# Patient Record
Sex: FEMALE | Race: OTHER | HISPANIC OR LATINO | URBAN - METROPOLITAN AREA
[De-identification: names, ages, dates, MRNs, and addresses within clinical notes are randomized per-mention and may not be internally consistent; named-entity substitution may affect disease eponyms.]

---

## 2022-04-01 ENCOUNTER — EMERGENCY (EMERGENCY)
Facility: HOSPITAL | Age: 22
LOS: 1 days | Discharge: HOME | End: 2022-04-01
Attending: EMERGENCY MEDICINE
Payer: SELF-PAY

## 2022-04-01 VITALS
DIASTOLIC BLOOD PRESSURE: 72 MMHG | TEMPERATURE: 99 F | WEIGHT: 138.01 LBS | HEIGHT: 62 IN | SYSTOLIC BLOOD PRESSURE: 121 MMHG | RESPIRATION RATE: 18 BRPM | HEART RATE: 89 BPM | OXYGEN SATURATION: 99 %

## 2022-04-01 PROCEDURE — 90471 IMMUNIZATION ADMIN: CPT

## 2022-04-01 PROCEDURE — 99283 EMERGENCY DEPT VISIT LOW MDM: CPT | Mod: 25

## 2022-04-01 PROCEDURE — 99283 EMERGENCY DEPT VISIT LOW MDM: CPT

## 2022-04-01 PROCEDURE — 90715 TDAP VACCINE 7 YRS/> IM: CPT

## 2022-04-01 RX ORDER — OFLOXACIN 0.3 %
1 DROPS OPHTHALMIC (EYE) ONCE
Refills: 0 | Status: COMPLETED | OUTPATIENT
Start: 2022-04-01 | End: 2022-04-01

## 2022-04-01 RX ORDER — IBUPROFEN 200 MG
600 TABLET ORAL ONCE
Refills: 0 | Status: DISCONTINUED | OUTPATIENT
Start: 2022-04-01 | End: 2022-04-01

## 2022-04-01 RX ORDER — ACETAMINOPHEN 500 MG
650 TABLET ORAL ONCE
Refills: 0 | Status: DISCONTINUED | OUTPATIENT
Start: 2022-04-01 | End: 2022-04-01

## 2022-04-01 RX ORDER — TETANUS TOXOID, REDUCED DIPHTHERIA TOXOID AND ACELLULAR PERTUSSIS VACCINE, ADSORBED 5; 2.5; 8; 8; 2.5 [IU]/.5ML; [IU]/.5ML; UG/.5ML; UG/.5ML; UG/.5ML
0.5 SUSPENSION INTRAMUSCULAR ONCE
Refills: 0 | Status: COMPLETED | OUTPATIENT
Start: 2022-04-01 | End: 2022-04-01

## 2022-04-01 RX ADMIN — TETANUS TOXOID, REDUCED DIPHTHERIA TOXOID AND ACELLULAR PERTUSSIS VACCINE, ADSORBED 0.5 MILLILITER(S): 5; 2.5; 8; 8; 2.5 SUSPENSION INTRAMUSCULAR at 21:15

## 2022-04-01 RX ADMIN — Medication 1 DROP(S): at 21:16

## 2022-04-01 NOTE — ED PROVIDER NOTE - CLINICAL SUMMARY MEDICAL DECISION MAKING FREE TEXT BOX
corneal abrasion noted, will treat with eye drops, update tetanus, f/u with ophtho, FROM of the eye, pressures stable, normal eye exam

## 2022-04-01 NOTE — ED PROVIDER NOTE - PATIENT PORTAL LINK FT
You can access the FollowMyHealth Patient Portal offered by [facility] by registering at the following website: [email or web address]. By joining [employer]’s FollowMyHealth portal, you will also be able to view your health information using other applications (apps) compatible with our system.

## 2022-04-01 NOTE — ED PROVIDER NOTE - ATTENDING CONTRIBUTION TO CARE
I, Blayne Lowry, have personally performed a face to face diagnostic evaluation on this patient. I have reviewed the RAMON note and agree with the history, exam and plan of care, except as noted.    22 yo F p/w right eye pain x 1 day. foreign body sensation. no contact use.  no foreign body seen but found to have corneal abrasion. eye drops given. opthalmology follow up.

## 2022-04-01 NOTE — ED PROVIDER NOTE - NSFOLLOWUPINSTRUCTIONS_ED_ALL_ED_FT
1) Neeru un seguimiento con mullen médico de atención primaria en los próximos 5-7 días. Llame mañana para concertar femi sonja. Si no puede hacer un seguimiento con mullen médico de atención primaria, regrese al servicio de urgencias por cualquier problema urgente.  2) Se le entregó femi copia de las pruebas realizadas hoy. Traiga los resultados y revíselos con mullen médico de atención primaria.  3) Si tiene algún empeoramiento de los síntomas o cualquier otra inquietud, regrese al servicio de urgencias de inmediato.  4) Continúe tomando kathy medicamentos caseros según las indicaciones.

## 2022-04-01 NOTE — ED PROVIDER NOTE - CARE PROVIDER_API CALL
Ruiz Knutson)  Ophthalmology  [address] [place] [address]  Phone: [phone]  Fax: [phone]  Follow Up Time:

## 2022-04-01 NOTE — ED PROVIDER NOTE - OBJECTIVE STATEMENT
20 y/o female with no sign medical history presents to the ED c/o right eye pain for the past day. notes symptoms last approx 6 hours. Denies any trauma or foreign body in the eye. Pain with light exposure and movement of the eye. Denies fevers, chills, n/v, loss of vision, blurry vision. tetanus unknown.

## 2022-04-01 NOTE — ED PROVIDER NOTE - NS ED ATTENDING STATEMENT MOD
This was a shared visit with the RAMON. I reviewed and verified the documentation and independently performed the documented:

## 2022-04-01 NOTE — ED PROVIDER NOTE - CHPI ED SYMPTOMS NEG
no blurred vision/no discharge/no drainage/no itching/no purulent drainage/no double vision/no eye lid swelling/no foreign body

## 2022-04-01 NOTE — ED ADULT NURSE NOTE - OBJECTIVE STATEMENT
Pt in no apparent distress at this time. Airway patent, breathing spontaneous and nonlabored. Pt A&Ox3 resting in stretcher. Pt c/o    right eye pain.

## 2022-10-17 NOTE — ED PROVIDER NOTE - PATIENT/CAREGIVER ACCEPTED INTERPRETER SERVICES
You can access the FollowMyHealth Patient Portal offered by [facility] by registering at the following website: [email or web address]. By joining [employer]’s FollowMyHealth portal, you will also be able to view your health information using other applications (apps) compatible with our system. yes

## 2023-02-16 ENCOUNTER — EMERGENCY (EMERGENCY)
Facility: HOSPITAL | Age: 23
LOS: 1 days | Discharge: HOME | End: 2023-02-16
Attending: EMERGENCY MEDICINE
Payer: SELF-PAY

## 2023-02-16 VITALS
TEMPERATURE: 99 F | HEART RATE: 107 BPM | DIASTOLIC BLOOD PRESSURE: 92 MMHG | RESPIRATION RATE: 18 BRPM | SYSTOLIC BLOOD PRESSURE: 143 MMHG | OXYGEN SATURATION: 99 % | HEIGHT: 63 IN | WEIGHT: 160.28 LBS

## 2023-02-16 VITALS — OXYGEN SATURATION: 99 % | HEART RATE: 92 BPM | RESPIRATION RATE: 17 BRPM

## 2023-02-16 PROBLEM — Z78.9 OTHER SPECIFIED HEALTH STATUS: Chronic | Status: ACTIVE | Noted: 2022-04-01

## 2023-02-16 PROCEDURE — 99282 EMERGENCY DEPT VISIT SF MDM: CPT

## 2023-02-16 PROCEDURE — 99283 EMERGENCY DEPT VISIT LOW MDM: CPT

## 2023-02-16 PROCEDURE — 99284 EMERGENCY DEPT VISIT MOD MDM: CPT

## 2023-02-16 NOTE — ED PROVIDER NOTE - CLINICAL SUMMARY MEDICAL DECISION MAKING FREE TEXT BOX
PT with stable VS, no acute distress, non toxic appearing, tolerating PO in the ED, Pt with no acute findings on PE, Pt with low risk exposure, symptomatic improvement with rest, Pt cleared for dc home with supportive care, follow up to PCP, educated about when to return to the ED if needed. PT verbalizes that he understands all instructions and results. Pt informed that ED is open and available 24/7 365 days a yr, encouraged to return to the ED if they have any change in condition, or feel the need for revaluation.    utilized to obtain History, ROS, Physical Exam, explanations of results and plan of care, as well as follow up instructions.

## 2023-02-16 NOTE — ED PROVIDER NOTE - ATTENDING APP SHARED VISIT CONTRIBUTION OF CARE
: dipak garza eye burning, cough, nose bleed and feeling lightheaded while at work, cleaning with chemicals.  PE:  nontoxic appearing, NARD, mild conjunctival injection jese eyes, normal oropharynx, chest CTA jese without r/r/w.  A/P chemical conjunctivitis, upper airway irritation due to chemical exposure.  Advised non-medicated visine drops as needed for eye dryness, non-medicated nasal saline spray, cool mist humidifier.

## 2023-02-16 NOTE — ED PROVIDER NOTE - NS ED ROS FT
ROS: CONTUSIONAL: Denies fever, chills, fatigue, wt loss. HEAD: HA,  Denies trauma, Dizziness. EYE: Denies Acute visual changes, diplopia. ENMT:  epistaxis,  sore throat.  Denies change in hearing, tinnitus, difficulty swallowing,CARDIO: Denies CP, palpitations, edema. RESP: Denies Cough, SOB , Diff breathing, hemoptysis. GI: Denies N/V, ABD pain, change in bowel movement. URINARY: Denies difficulty urinating, pelvic pain. MS:  Denies joint pain, back pain, weakness, decreased ROM, swelling. NEURO: Denies change in gait, seizures, loss of sensation, dizziness, confusion LOC.  PSY: NO SI/HI. SKIN: Denies Rash, bruising.

## 2023-02-16 NOTE — ED PROVIDER NOTE - OBJECTIVE STATEMENT
PT with no SPMHx presents to the ED with complaint of nose bleed and HA that s PT with no SPMHx presents to the ED with complaint of nose bleed and HA. Pt states that she was at home cleaning her bathroom when she used Lysol, soft scrub and Clorox all at the same time. Pt states that she had a gradual onset of HA, mild throat pain that feels like burning then developed a bloody nose. Pt dines fever, chills, SOB, diff breathing, change in vision, LOC, rash, hematopoesis.

## 2023-02-16 NOTE — ED PROVIDER NOTE - ADDITIONAL NOTES AND INSTRUCTIONS:
PT was evaluated At [facility] ED and was found to have a condition that warranted time of to rest and heal from WORK/SCHOOL.   Nitesh Rincon PA-C

## 2023-02-16 NOTE — ED PROVIDER NOTE - NSFOLLOWUPINSTRUCTIONS_ED_ALL_ED_FT
Lesión por inhalación de sustancias químicas en los adultos    Chemical Inhalation Injury, Adult       Femi lesión por inhalación de sustancias químicas ocurre cuando femi persona respira (inhala) gases o partículas de sustancias químicas que dañan la garganta, las vías respiratorias o los pulmones (aparato respiratorio).    Las lesiones por inhalación de sustancias químicas pueden variar desde femi leve irritación hasta femi grave lesión en lo profundo de los pulmones. [place] podría afectar mullen capacidad para respirar. La gravedad de la lesión dependerá de la sustancia química involucrada, de cuán nociva es y del tiempo de exposición a esta.      ¿Cuáles son las causas?    Las lesiones por inhalación de sustancias químicas son consecuencia de la inhalación de gases o partículas de sustancias químicas. Estas lesiones ocurren con más frecuencia:  •Vero los incendios, cuando los materiales que se queman liberan sustancias químicas en el ambiente.      •En accidentes laborales, cuando se derraman grandes cantidades de sustancias químicas tóxicas en fábricas o industrias.      También es posible encontrar sustancias químicas que podrían ser perjudiciales en muchos limpiadores y otros productos domésticos comunes; por ejemplo:  •Aerosoles.      •Limpiadores de hornos.      •Desodorantes de ambiente.      •Lustres para muebles.      •Fertilizantes, pesticidas e insecticidas.      •Limpiadores de metales o solventes utilizados para el grabado de metales.      Mezclar amoníaco (o un producto que contenga amoníaco) con lejía (o un producto que contenga lejía) es otra causa frecuente de las lesiones por inhalación de sustancias químicas domésticas. Esta mezcla produce un gas nocivo, llamado cloramina, que puede irritar los pulmones. La mezcla de otros productos de limpieza también a veces puede crear productos químicos nocivos.      ¿Qué incrementa el riesgo?    Los siguientes factores pueden hacerlo más propenso a sufrir natasha tipo de lesión:  •Estar expuesto a materiales que están ardiendo o a humo.      •Trabajar con sustancias químicas, solventes o agentes de limpieza.      •Trabajar en femi fábrica o en un trabajo agrícola.      •Trabajar en áreas mal ventiladas, con un flujo de aire deficiente.      •No usar equipo de protección adecuado, yoli gafas, máscaras o guantes.        ¿Cuáles son los signos o síntomas?    Los síntomas de esta lesión pueden incluir lo siguiente:  •Ardor o irritación en los ojos, la nariz, la garganta y la tráquea, y áreas expuestas de la piel.      •Tos.    •Problemas respiratorios, yoli:  •Falta de aire.      •Producir sonidos agudos al respirar, yoli silbidos al exhalar (sibilancias) o sonidos dang al inspirar (estridor).      •Dificultad para respirar. [place] es consecuencia de la hinchazón de las vías respiratorias.        •Dolor de pecho.      •Tos con alec.      •Dolor de vasiliy.      •Mareos.      •Náuseas o vómitos.      •Dolor abdominal.      •Debilidad o cansancio (fatiga).      •Confusión.      •Desmayos.      Según el tipo de la sustancia química a la que estuvo expuesto, podría tener síntomas de inmediato o hasta 12 horas después.      ¿Cómo se diagnostica?    Esta lesión se diagnostica en función de los síntomas, los antecedentes médicos y un examen físico. También pueden hacerle estudios, que incluyen los siguientes:  •ECG (electrocardiograma) y monitoreo cardíaco para detectar lesiones cardíacas.      •Pruebas para determinar el nivel de oxígeno de la alec (oximetría de pulso).    •Estudios de diagnóstico por imágenes, por ejemplo:  •Radiografías de tórax. En estas se procura detectar la acumulación de líquido o inflamación en las vías respiratorias pequeñas de los pulmones (bronquiolos).      •Exploración por tomografía computarizada (TC) de tórax. En natasha estudio se pueden detectar lesiones que en las radiografías no aparecen.        •Análisis de alec para comprobar si hay daños producidos por ciertas sustancias químicas, yoli el monóxido de carbono.      •Procedimientos utilizados para examinar directamente la boca y la garganta (laringoscopia) o la parte inferior de las vías respiratorias y pulmones (broncoscopia).        ¿Cómo se trata?    El tratamiento de esta afección puede incluir lo siguiente:  •Usar agua o femi solución salina para eliminar el producto químico de todas las zonas expuestas a él, yoli la piel, los ojos, la nariz o la boca.      •Oxigenoterapia suplementaria. Es el principal tratamiento para esta lesión. También podrían administrarle femi mayor cantidad de oxígeno mediante femi mascarilla o femi cánula nasal. En el florina de las lesiones graves, se podría necesitar un equipo (respirador) para ayudarlo a respirar.      •Medicamentos para abrir las vías respiratorias y disminuir la inflamación (broncodilatadores o corticoesteroides).      •Líquidos intravenosos (i.v.).      •Antibióticos para prevenir o tratar infecciones pulmonares causadas por bacterias.      •Medicamentos específicos (antídotos) que bloquean o revierten el efecto de ciertos productos químicos tóxicos, yoli el cianuro.      Según la gravedad de la lesión, es posible que deba permanecer en el hospital para que lo controlen rigurosamente.      Siga estas instrucciones en mullen casa:    •Use los medicamentos de venta edwardo y los recetados solamente yoli se lo haya indicado el médico.      •Si le recetaron un antibiótico, tómelo yoli se lo haya indicado el médico. No deje de cleopatra el antibiótico aunque comience a sentirse mejor.      •Retome kathy actividades normales según lo indicado por el médico. Pregúntele al médico qué actividades son seguras para usted.      • No consuma ningún producto que contenga nicotina o tabaco. Estos productos incluyen cigarrillos, tabaco para mascar y aparatos de vapeo, yoli los cigarrillos electrónicos. Si necesita ayuda para dejar de consumir estos productos, consulte al médico.      •Concurra a todas las visitas de seguimiento. [place] es importante.        ¿Cómo se previene?    Para prevenir natasha tipo de lesiones:  •No mezcle lejía ni ningún producto que contenga lejía con ningún limpiador que contenga amoníaco.      •No coloque sustancias químicas potencialmente nocivas en atomizadores.      •Christine todas las etiquetas y siga las indicaciones para usar los productos de limpieza.      •Únicamente utilice productos de limpieza en lugares con mucha ventilación.      •Si mullen puesto de trabajo implica un riesgo de inhalación de sustancias químicas, siga minuciosamente las medidas de seguridad. Es muy importante que limite mullen exposición a gases y sustancias químicas.      •Use protección adecuada para los ojos, la rosalie y las tima cuando esté cerca de sustancias químicas.      •Si siente un olor jacki o tiene ganas de vomitar, retírese de la ravin de inmediato y vaya a femi ravin shiv ventilada.        Comuníquese con un médico si:    •Tiene fiebre.      •Los síntomas no mejoran o empeoran.      •Tiene dolor de vasiliy.        Solicite ayuda de inmediato si:    •Tiene dificultad para respirar.      •Siente dolor en el pecho o dolor abdominal.      •Tose y despide esputo espeso o alec.      •Tiene náuseas o vómitos.      •Tiene la voz ronca o siente dolor al tragar, respirar o hablar.      •Se siente confundido.      •Se desmaya.      Estos síntomas pueden representar un problema grave que constituye femi emergencia. No espere a kevin si los síntomas desaparecen. Solicite atención médica de inmediato. Comuníquese con el servicio de emergencias de mullen localidad (911 en [place]). No conduzca por kathy propios medios hasta el hospital.       Resumen    •Femi lesión por inhalación de sustancias químicas ocurre cuando femi persona respira (inhala) gases o partículas de sustancias químicas que dañan la garganta, las vías respiratorias o los pulmones (aparato respiratorio).      •Las lesiones por inhalación de sustancias químicas ocurren con más frecuencia vero incendios o accidentes en el trabajo. También es posible encontrar sustancias químicas que podrían ser perjudiciales en muchos limpiadores y otros productos domésticos comunes.      •El principal tratamiento para esta lesión es la oxigenoterapia suplementaria. También podrían administrarle femi mayor cantidad de oxígeno mediante femi mascarilla o femi cánula nasal.      •Si mullen puesto de trabajo implica un riesgo de inhalación de sustancias químicas, siga minuciosamente las medidas de seguridad. Es muy importante que limite mullen exposición a gases y sustancias químicas.      Esta información no tiene yoli fin reemplazar el consejo del médico. Asegúrese de hacerle al médico cualquier pregunta que tenga. non-medicated visine drops (saline drops) as needed for eye dryness, non-medicated nasal saline spray, cool mist humidifier.      Lesión por inhalación de sustancias químicas en los adultos    Chemical Inhalation Injury, Adult       Femi lesión por inhalación de sustancias químicas ocurre cuando femi persona respira (inhala) gases o partículas de sustancias químicas que dañan la garganta, las vías respiratorias o los pulmones (aparato respiratorio).    Las lesiones por inhalación de sustancias químicas pueden variar desde femi leve irritación hasta femi grave lesión en lo profundo de los pulmones. [place] podría afectar mullen capacidad para respirar. La gravedad de la lesión dependerá de la sustancia química involucrada, de cuán nociva es y del tiempo de exposición a esta.      ¿Cuáles son las causas?    Las lesiones por inhalación de sustancias químicas son consecuencia de la inhalación de gases o partículas de sustancias químicas. Estas lesiones ocurren con más frecuencia:  •Vero los incendios, cuando los materiales que se queman liberan sustancias químicas en el ambiente.      •En accidentes laborales, cuando se derraman grandes cantidades de sustancias químicas tóxicas en fábricas o industrias.      También es posible encontrar sustancias químicas que podrían ser perjudiciales en muchos limpiadores y otros productos domésticos comunes; por ejemplo:  •Aerosoles.      •Limpiadores de hornos.      •Desodorantes de ambiente.      •Lustres para muebles.      •Fertilizantes, pesticidas e insecticidas.      •Limpiadores de metales o solventes utilizados para el grabado de metales.      Mezclar amoníaco (o un producto que contenga amoníaco) con lejía (o un producto que contenga lejía) es otra causa frecuente de las lesiones por inhalación de sustancias químicas domésticas. Esta mezcla produce un gas nocivo, llamado cloramina, que puede irritar los pulmones. La mezcla de otros productos de limpieza también a veces puede crear productos químicos nocivos.      ¿Qué incrementa el riesgo?    Los siguientes factores pueden hacerlo más propenso a sufrir natasha tipo de lesión:  •Estar expuesto a materiales que están ardiendo o a humo.      •Trabajar con sustancias químicas, solventes o agentes de limpieza.      •Trabajar en femi fábrica o en un trabajo agrícola.      •Trabajar en áreas mal ventiladas, con un flujo de aire deficiente.      •No usar equipo de protección adecuado, yoli gafas, máscaras o guantes.        ¿Cuáles son los signos o síntomas?    Los síntomas de esta lesión pueden incluir lo siguiente:  •Ardor o irritación en los ojos, la nariz, la garganta y la tráquea, y áreas expuestas de la piel.      •Tos.    •Problemas respiratorios, yoli:  •Falta de aire.      •Producir sonidos agudos al respirar, yoli silbidos al exhalar (sibilancias) o sonidos dang al inspirar (estridor).      •Dificultad para respirar. [place] es consecuencia de la hinchazón de las vías respiratorias.        •Dolor de pecho.      •Tos con alec.      •Dolor de vasiliy.      •Mareos.      •Náuseas o vómitos.      •Dolor abdominal.      •Debilidad o cansancio (fatiga).      •Confusión.      •Desmayos.      Según el tipo de la sustancia química a la que estuvo expuesto, podría tener síntomas de inmediato o hasta 12 horas después.      ¿Cómo se diagnostica?    Esta lesión se diagnostica en función de los síntomas, los antecedentes médicos y un examen físico. También pueden hacerle estudios, que incluyen los siguientes:  •ECG (electrocardiograma) y monitoreo cardíaco para detectar lesiones cardíacas.      •Pruebas para determinar el nivel de oxígeno de la alec (oximetría de pulso).    •Estudios de diagnóstico por imágenes, por ejemplo:  •Radiografías de tórax. En estas se procura detectar la acumulación de líquido o inflamación en las vías respiratorias pequeñas de los pulmones (bronquiolos).      •Exploración por tomografía computarizada (TC) de tórax. En natasha estudio se pueden detectar lesiones que en las radiografías no aparecen.        •Análisis de alec para comprobar si hay daños producidos por ciertas sustancias químicas, yoli el monóxido de carbono.      •Procedimientos utilizados para examinar directamente la boca y la garganta (laringoscopia) o la parte inferior de las vías respiratorias y pulmones (broncoscopia).        ¿Cómo se trata?    El tratamiento de esta afección puede incluir lo siguiente:  •Usar agua o femi solución salina para eliminar el producto químico de todas las zonas expuestas a él, yoli la piel, los ojos, la nariz o la boca.      •Oxigenoterapia suplementaria. Es el principal tratamiento para esta lesión. También podrían administrarle femi mayor cantidad de oxígeno mediante femi mascarilla o femi cánula nasal. En el florina de las lesiones graves, se podría necesitar un equipo (respirador) para ayudarlo a respirar.      •Medicamentos para abrir las vías respiratorias y disminuir la inflamación (broncodilatadores o corticoesteroides).      •Líquidos intravenosos (i.v.).      •Antibióticos para prevenir o tratar infecciones pulmonares causadas por bacterias.      •Medicamentos específicos (antídotos) que bloquean o revierten el efecto de ciertos productos químicos tóxicos, yoli el cianuro.      Según la gravedad de la lesión, es posible que deba permanecer en el hospital para que lo controlen rigurosamente.      Siga estas instrucciones en mullen casa:    •Use los medicamentos de venta edwardo y los recetados solamente yoli se lo haya indicado el médico.      •Si le recetaron un antibiótico, tómelo yoli se lo haya indicado el médico. No deje de cleopatra el antibiótico aunque comience a sentirse mejor.      •Retome kathy actividades normales según lo indicado por el médico. Pregúntele al médico qué actividades son seguras para usted.      • No consuma ningún producto que contenga nicotina o tabaco. Estos productos incluyen cigarrillos, tabaco para mascar y aparatos de vapeo, yoli los cigarrillos electrónicos. Si necesita ayuda para dejar de consumir estos productos, consulte al médico.      •Concurra a todas las visitas de seguimiento. [place] es importante.        ¿Cómo se previene?    Para prevenir natasha tipo de lesiones:  •No mezcle lejía ni ningún producto que contenga lejía con ningún limpiador que contenga amoníaco.      •No coloque sustancias químicas potencialmente nocivas en atomizadores.      •Christine todas las etiquetas y siga las indicaciones para usar los productos de limpieza.      •Únicamente utilice productos de limpieza en lugares con mucha ventilación.      •Si mullen puesto de trabajo implica un riesgo de inhalación de sustancias químicas, siga minuciosamente las medidas de seguridad. Es muy importante que limite mullen exposición a gases y sustancias químicas.      •Use protección adecuada para los ojos, la rosalie y las tima cuando esté cerca de sustancias químicas.      •Si siente un olor jacki o tiene ganas de vomitar, retírese de la raivn de inmediato y vaya a femi ravin shiv ventilada.        Comuníquese con un médico si:    •Tiene fiebre.      •Los síntomas no mejoran o empeoran.      •Tiene dolor de vasiliy.        Solicite ayuda de inmediato si:    •Tiene dificultad para respirar.      •Siente dolor en el pecho o dolor abdominal.      •Tose y despide esputo espeso o alec.      •Tiene náuseas o vómitos.      •Tiene la voz ronca o siente dolor al tragar, respirar o hablar.      •Se siente confundido.      •Se desmaya.      Estos síntomas pueden representar un problema grave que constituye femi emergencia. No espere a kevin si los síntomas desaparecen. Solicite atención médica de inmediato. Comuníquese con el servicio de emergencias de mullen localidad (911 en [place]). No conduzca por kathy propios medios hasta el hospital.       Resumen    •Femi lesión por inhalación de sustancias químicas ocurre cuando femi persona respira (inhala) gases o partículas de sustancias químicas que dañan la garganta, las vías respiratorias o los pulmones (aparato respiratorio).      •Las lesiones por inhalación de sustancias químicas ocurren con más frecuencia vero incendios o accidentes en el trabajo. También es posible encontrar sustancias químicas que podrían ser perjudiciales en muchos limpiadores y otros productos domésticos comunes.      •El principal tratamiento para esta lesión es la oxigenoterapia suplementaria. También podrían administrarle femi mayor cantidad de oxígeno mediante femi mascarilla o femi cánula nasal.      •Si mullen puesto de trabajo implica un riesgo de inhalación de sustancias químicas, siga minuciosamente las medidas de seguridad. Es muy importante que limite mullen exposición a gases y sustancias químicas.      Esta información no tiene yoli fin reemplazar el consejo del médico. Asegúrese de hacerle al médico cualquier pregunta que tenga.

## 2023-02-16 NOTE — ED PROVIDER NOTE - PATIENT PORTAL LINK FT
You can access the FollowMyHealth Patient Portal offered by [facility] by registering at the following website: [email or web address]. By joining [employer]’s FollowMyHealth portal, you will also be able to view your health information using other applications (apps) compatible with our system.

## 2024-04-23 NOTE — ED PROVIDER NOTE - EYES
Right Abnormal/Left Normal
Billing Type: Third-Party Bill
Expected Date Of Service: 05/18/2024
Performing Laboratory: -[id number]
Bill For Surgical Tray: no

## 2024-05-26 ENCOUNTER — EMERGENCY (EMERGENCY)
Facility: HOSPITAL | Age: 24
LOS: 1 days | Discharge: HOME | End: 2024-05-26
Attending: STUDENT IN AN ORGANIZED HEALTH CARE EDUCATION/TRAINING PROGRAM
Payer: SELF-PAY

## 2024-05-26 VITALS
TEMPERATURE: 99 F | RESPIRATION RATE: 101 BRPM | HEART RATE: 101 BPM | SYSTOLIC BLOOD PRESSURE: 119 MMHG | OXYGEN SATURATION: 98 % | DIASTOLIC BLOOD PRESSURE: 84 MMHG

## 2024-05-26 VITALS
SYSTOLIC BLOOD PRESSURE: 129 MMHG | OXYGEN SATURATION: 97 % | WEIGHT: 169.54 LBS | HEART RATE: 10 BPM | RESPIRATION RATE: 103 BRPM | TEMPERATURE: 99 F | DIASTOLIC BLOOD PRESSURE: 91 MMHG

## 2024-05-26 LAB
ALBUMIN SERPL ELPH-MCNC: 4.3 G/DL — SIGNIFICANT CHANGE UP (ref 3.3–5.2)
ALP SERPL-CCNC: 72 U/L — SIGNIFICANT CHANGE UP (ref 40–120)
ALT FLD-CCNC: 27 U/L — SIGNIFICANT CHANGE UP
ANION GAP SERPL CALC-SCNC: 13 MMOL/L — SIGNIFICANT CHANGE UP (ref 5–17)
APPEARANCE UR: CLEAR — SIGNIFICANT CHANGE UP
AST SERPL-CCNC: 35 U/L — HIGH
BACTERIA # UR AUTO: ABNORMAL /HPF
BASOPHILS # BLD AUTO: 0.01 K/UL — SIGNIFICANT CHANGE UP (ref 0–0.2)
BASOPHILS NFR BLD AUTO: 0.1 % — SIGNIFICANT CHANGE UP (ref 0–2)
BILIRUB SERPL-MCNC: 0.2 MG/DL — LOW (ref 0.4–2)
BILIRUB UR-MCNC: NEGATIVE — SIGNIFICANT CHANGE UP
BUN SERPL-MCNC: 10.5 MG/DL — SIGNIFICANT CHANGE UP (ref 8–20)
CALCIUM SERPL-MCNC: 9.8 MG/DL — SIGNIFICANT CHANGE UP (ref 8.4–10.5)
CAST: 2 /LPF — SIGNIFICANT CHANGE UP (ref 0–4)
CHLORIDE SERPL-SCNC: 98 MMOL/L — SIGNIFICANT CHANGE UP (ref 96–108)
CO2 SERPL-SCNC: 23 MMOL/L — SIGNIFICANT CHANGE UP (ref 22–29)
COLOR SPEC: YELLOW — SIGNIFICANT CHANGE UP
CREAT SERPL-MCNC: 0.57 MG/DL — SIGNIFICANT CHANGE UP (ref 0.5–1.3)
DIFF PNL FLD: ABNORMAL
EGFR: 128 ML/MIN/1.73M2 — SIGNIFICANT CHANGE UP
EOSINOPHIL # BLD AUTO: 0.02 K/UL — SIGNIFICANT CHANGE UP (ref 0–0.5)
EOSINOPHIL NFR BLD AUTO: 0.3 % — SIGNIFICANT CHANGE UP (ref 0–6)
GLUCOSE SERPL-MCNC: 96 MG/DL — SIGNIFICANT CHANGE UP (ref 70–99)
GLUCOSE UR QL: NEGATIVE MG/DL — SIGNIFICANT CHANGE UP
HCG SERPL-ACNC: <4 MIU/ML — SIGNIFICANT CHANGE UP
HCT VFR BLD CALC: 38.1 % — SIGNIFICANT CHANGE UP (ref 34.5–45)
HGB BLD-MCNC: 12.7 G/DL — SIGNIFICANT CHANGE UP (ref 11.5–15.5)
IMM GRANULOCYTES NFR BLD AUTO: 0.1 % — SIGNIFICANT CHANGE UP (ref 0–0.9)
KETONES UR-MCNC: NEGATIVE MG/DL — SIGNIFICANT CHANGE UP
LEUKOCYTE ESTERASE UR-ACNC: ABNORMAL
LYMPHOCYTES # BLD AUTO: 0.95 K/UL — LOW (ref 1–3.3)
LYMPHOCYTES # BLD AUTO: 13.4 % — SIGNIFICANT CHANGE UP (ref 13–44)
MCHC RBC-ENTMCNC: 29.8 PG — SIGNIFICANT CHANGE UP (ref 27–34)
MCHC RBC-ENTMCNC: 33.3 GM/DL — SIGNIFICANT CHANGE UP (ref 32–36)
MCV RBC AUTO: 89.4 FL — SIGNIFICANT CHANGE UP (ref 80–100)
MONOCYTES # BLD AUTO: 0.63 K/UL — SIGNIFICANT CHANGE UP (ref 0–0.9)
MONOCYTES NFR BLD AUTO: 8.9 % — SIGNIFICANT CHANGE UP (ref 2–14)
NEUTROPHILS # BLD AUTO: 5.47 K/UL — SIGNIFICANT CHANGE UP (ref 1.8–7.4)
NEUTROPHILS NFR BLD AUTO: 77.2 % — HIGH (ref 43–77)
NITRITE UR-MCNC: NEGATIVE — SIGNIFICANT CHANGE UP
PH UR: 6.5 — SIGNIFICANT CHANGE UP (ref 5–8)
PLATELET # BLD AUTO: 245 K/UL — SIGNIFICANT CHANGE UP (ref 150–400)
POTASSIUM SERPL-MCNC: 4.4 MMOL/L — SIGNIFICANT CHANGE UP (ref 3.5–5.3)
POTASSIUM SERPL-SCNC: 4.4 MMOL/L — SIGNIFICANT CHANGE UP (ref 3.5–5.3)
PROT SERPL-MCNC: 8 G/DL — SIGNIFICANT CHANGE UP (ref 6.6–8.7)
PROT UR-MCNC: NEGATIVE MG/DL — SIGNIFICANT CHANGE UP
RBC # BLD: 4.26 M/UL — SIGNIFICANT CHANGE UP (ref 3.8–5.2)
RBC # FLD: 12.3 % — SIGNIFICANT CHANGE UP (ref 10.3–14.5)
RBC CASTS # UR COMP ASSIST: 1 /HPF — SIGNIFICANT CHANGE UP (ref 0–4)
SODIUM SERPL-SCNC: 134 MMOL/L — LOW (ref 135–145)
SP GR SPEC: 1.01 — SIGNIFICANT CHANGE UP (ref 1–1.03)
SQUAMOUS # UR AUTO: 1 /HPF — SIGNIFICANT CHANGE UP (ref 0–5)
UROBILINOGEN FLD QL: 0.2 MG/DL — SIGNIFICANT CHANGE UP (ref 0.2–1)
WBC # BLD: 7.09 K/UL — SIGNIFICANT CHANGE UP (ref 3.8–10.5)
WBC # FLD AUTO: 7.09 K/UL — SIGNIFICANT CHANGE UP (ref 3.8–10.5)
WBC UR QL: 49 /HPF — HIGH (ref 0–5)

## 2024-05-26 PROCEDURE — T1013: CPT

## 2024-05-26 PROCEDURE — 80053 COMPREHEN METABOLIC PANEL: CPT

## 2024-05-26 PROCEDURE — 99284 EMERGENCY DEPT VISIT MOD MDM: CPT

## 2024-05-26 PROCEDURE — 85025 COMPLETE CBC W/AUTO DIFF WBC: CPT

## 2024-05-26 PROCEDURE — 36415 COLL VENOUS BLD VENIPUNCTURE: CPT

## 2024-05-26 PROCEDURE — 84702 CHORIONIC GONADOTROPIN TEST: CPT

## 2024-05-26 PROCEDURE — 96374 THER/PROPH/DIAG INJ IV PUSH: CPT

## 2024-05-26 PROCEDURE — 81001 URINALYSIS AUTO W/SCOPE: CPT

## 2024-05-26 PROCEDURE — 99284 EMERGENCY DEPT VISIT MOD MDM: CPT | Mod: 25

## 2024-05-26 RX ORDER — ACETAMINOPHEN 500 MG
1000 TABLET ORAL ONCE
Refills: 0 | Status: COMPLETED | OUTPATIENT
Start: 2024-05-26 | End: 2024-05-26

## 2024-05-26 RX ORDER — SODIUM CHLORIDE 9 MG/ML
1000 INJECTION INTRAMUSCULAR; INTRAVENOUS; SUBCUTANEOUS ONCE
Refills: 0 | Status: COMPLETED | OUTPATIENT
Start: 2024-05-26 | End: 2024-05-26

## 2024-05-26 RX ORDER — CEPHALEXIN 500 MG
1 CAPSULE ORAL
Qty: 21 | Refills: 0
Start: 2024-05-26 | End: 2024-06-01

## 2024-05-26 RX ORDER — CEPHALEXIN 500 MG
500 CAPSULE ORAL ONCE
Refills: 0 | Status: COMPLETED | OUTPATIENT
Start: 2024-05-26 | End: 2024-05-26

## 2024-05-26 RX ADMIN — Medication 500 MILLIGRAM(S): at 19:48

## 2024-05-26 RX ADMIN — SODIUM CHLORIDE 1000 MILLILITER(S): 9 INJECTION INTRAMUSCULAR; INTRAVENOUS; SUBCUTANEOUS at 17:03

## 2024-05-26 RX ADMIN — Medication 400 MILLIGRAM(S): at 17:03

## 2024-05-26 NOTE — ED PROVIDER NOTE - PROGRESS NOTE DETAILS
Urinalysis positive for urinary tract infection.  Patient made aware of urine findings.  Patient DC stable condition Rx sent to patient pharmacy

## 2024-05-26 NOTE — ED PROVIDER NOTE - OBJECTIVE STATEMENT
24-year-old female presents to ED complaint of lower back discomfort pain x 2 days.  Patient admits to vomiting multiple times today and presents to the ED for an evaluation.  Patient denies any fever or chills.  Patient admits to taking Azo over-the-counter to help relieve the symptoms.  Patient stated that she has been having urinary frequency and urgency.  Patient denies any other issue at this time.

## 2024-05-26 NOTE — ED PROVIDER NOTE - CLINICAL SUMMARY MEDICAL DECISION MAKING FREE TEXT BOX
24-year-old female presents to ED complaint of lower back discomfort pain x 2 days.  Patient admits to vomiting multiple times today and presents to the ED for an evaluation.  Patient denies any fever or chills.  Patient admits to taking Azo over-the-counter to help relieve the symptoms.  Patient stated that she has been having urinary frequency and urgency.  Patient denies any other issue at this time.  HEENT: Normal findings, Eyes : PERRLA, EOMI , Nares clear and Throat : WNL  Lungs: Clear B/L with good air entry  CVS: S1-S2 , with no murmurs  Abd : Normal BS, with +  suprapubic tenderness or organomegaly.   Ext: Normal findings   urinalysis positive for UTI.  Patient made aware of urine findings.  Patient treated with Keflex in ED and D/C in stable condition with Rx sent to patient pharmacy

## 2024-05-26 NOTE — ED ADULT TRIAGE NOTE - CHIEF COMPLAINT QUOTE
Pt c/o LLQ pain radiating to back.  Bloody urine.  Onset 2 days ago.  Accompanying nausea, vomiting.

## 2024-05-26 NOTE — ED PROVIDER NOTE - PATIENT PORTAL LINK FT
You can access the FollowMyHealth Patient Portal offered by [facility] by registering at the following website: [email or web address]. By joining [employer]’s FollowMyHealth portal, you will also be able to view your health information using other applications (apps) compatible with our system.

## 2024-05-26 NOTE — ED PROVIDER NOTE - NSFOLLOWUPINSTRUCTIONS_ED_ALL_ED_FT
continue medication as prescribed  Follow-up with [facility] as discussed  Return to ED if worsening nausea vomiting or fever occurs.

## 2024-05-28 ENCOUNTER — EMERGENCY (EMERGENCY)
Facility: HOSPITAL | Age: 24
LOS: 1 days | Discharge: HOME | End: 2024-05-28
Attending: EMERGENCY MEDICINE
Payer: MEDICAID

## 2024-05-28 VITALS
SYSTOLIC BLOOD PRESSURE: 127 MMHG | OXYGEN SATURATION: 98 % | DIASTOLIC BLOOD PRESSURE: 87 MMHG | TEMPERATURE: 99 F | HEART RATE: 118 BPM | RESPIRATION RATE: 18 BRPM

## 2024-05-28 VITALS
RESPIRATION RATE: 18 BRPM | SYSTOLIC BLOOD PRESSURE: 98 MMHG | DIASTOLIC BLOOD PRESSURE: 76 MMHG | HEART RATE: 81 BPM | OXYGEN SATURATION: 99 %

## 2024-05-28 LAB
APPEARANCE UR: ABNORMAL
BACTERIA # UR AUTO: ABNORMAL /HPF
BILIRUB UR-MCNC: NEGATIVE — SIGNIFICANT CHANGE UP
CAST: 1 /LPF — SIGNIFICANT CHANGE UP (ref 0–4)
COLOR SPEC: YELLOW — SIGNIFICANT CHANGE UP
DIFF PNL FLD: ABNORMAL
GLUCOSE UR QL: NEGATIVE MG/DL — SIGNIFICANT CHANGE UP
KETONES UR-MCNC: NEGATIVE MG/DL — SIGNIFICANT CHANGE UP
LEUKOCYTE ESTERASE UR-ACNC: ABNORMAL
NITRITE UR-MCNC: NEGATIVE — SIGNIFICANT CHANGE UP
PH UR: 6.5 — SIGNIFICANT CHANGE UP (ref 5–8)
PROT UR-MCNC: NEGATIVE MG/DL — SIGNIFICANT CHANGE UP
RBC CASTS # UR COMP ASSIST: 10 /HPF — HIGH (ref 0–4)
SP GR SPEC: 1.02 — SIGNIFICANT CHANGE UP (ref 1–1.03)
SQUAMOUS # UR AUTO: 15 /HPF — HIGH (ref 0–5)
UROBILINOGEN FLD QL: 1 MG/DL — SIGNIFICANT CHANGE UP (ref 0.2–1)
WBC UR QL: 27 /HPF — HIGH (ref 0–5)

## 2024-05-28 PROCEDURE — 87086 URINE CULTURE/COLONY COUNT: CPT

## 2024-05-28 PROCEDURE — 81001 URINALYSIS AUTO W/SCOPE: CPT

## 2024-05-28 PROCEDURE — T1013: CPT

## 2024-05-28 PROCEDURE — 99284 EMERGENCY DEPT VISIT MOD MDM: CPT

## 2024-05-28 PROCEDURE — 99053 MED SERV 10PM-8AM 24 HR FAC: CPT

## 2024-05-28 RX ORDER — DIPHENHYDRAMINE HCL 50 MG
50 CAPSULE ORAL ONCE
Refills: 0 | Status: COMPLETED | OUTPATIENT
Start: 2024-05-28 | End: 2024-05-28

## 2024-05-28 RX ORDER — DIPHENHYDRAMINE HCL 50 MG
1 CAPSULE ORAL
Qty: 15 | Refills: 0
Start: 2024-05-28 | End: 2024-06-01

## 2024-05-28 RX ORDER — IBUPROFEN 200 MG
1 TABLET ORAL
Qty: 15 | Refills: 0
Start: 2024-05-28 | End: 2024-06-01

## 2024-05-28 RX ORDER — FAMOTIDINE 10 MG/ML
1 INJECTION INTRAVENOUS
Qty: 10 | Refills: 0
Start: 2024-05-28 | End: 2024-06-01

## 2024-05-28 RX ORDER — FAMOTIDINE 10 MG/ML
40 INJECTION INTRAVENOUS ONCE
Refills: 0 | Status: COMPLETED | OUTPATIENT
Start: 2024-05-28 | End: 2024-05-28

## 2024-05-28 RX ORDER — IBUPROFEN 200 MG
600 TABLET ORAL ONCE
Refills: 0 | Status: COMPLETED | OUTPATIENT
Start: 2024-05-28 | End: 2024-05-28

## 2024-05-28 RX ADMIN — Medication 600 MILLIGRAM(S): at 08:04

## 2024-05-28 RX ADMIN — Medication 50 MILLIGRAM(S): at 08:04

## 2024-05-28 RX ADMIN — FAMOTIDINE 40 MILLIGRAM(S): 10 INJECTION INTRAVENOUS at 08:04

## 2024-05-28 NOTE — ED PROVIDER NOTE - PHYSICAL EXAMINATION
Const: Awake, alert and oriented. In no acute distress. Well appearing. Hot to touch.  HEENT: NC/AT. Moist mucous membranes.  Eyes: No scleral icterus. EOMI.  Neck:. Soft and supple. Full ROM without pain. No meningismus.  Cardiac: Tachycardic rate and regular rhythm. +S1/S2. No murmurs. Peripheral pulses 2+ and symmetric. No LE edema.  Resp: Speaking in full sentences. No evidence of respiratory distress. No wheezes, rales or rhonchi.  Abd: Soft, non-tender, non-distended. Normal bowel sounds in all 4 quadrants. No guarding or rebound.  Back: Spine midline and non-tender. No CVAT.  Skin: multiple, dispersed raised papules on erythematous base, non-blanching, sparing palms/soles/mucosal membranes, no urticaria.  Neuro: Awake, alert & oriented x 3. Moves all extremities symmetrically.

## 2024-05-28 NOTE — ED PROVIDER NOTE - NS ED ROS FT
Const: + fever, + chills  HEENT: Denies blurry vision, sore throat  Neck: Denies neck pain/stiffness  Resp: Denies coughing, SOB  Cardiovascular: Denies CP, palpitations, LE edema  GI: Denies nausea, vomiting, abdominal pain, diarrhea, constipation, blood in stool  : Denies urinary frequency/urgency/dysuria, hematuria  MSK: Denies back pain  Neuro: + HA. Denies dizziness, numbness, weakness  Skin: + rashes.

## 2024-05-28 NOTE — ED PROVIDER NOTE - NSFOLLOWUPINSTRUCTIONS_ED_ALL_ED_FT
Erupción medicamentosa  Drug Rash  Close-up of red and inflamed skin around a bandage after an injection.   Femi erupción medicamentosa ocurre cuando un medicamento provoca un cambio en el color o la textura de la piel. Puede desarrollarse minutos, horas o días después de cleopatra el medicamento. La erupción puede aparecer en femi ravin pequeña de piel o en todo el cuerpo.    ¿Cuáles son las causas?  La causa de esta afección puede ser femi de estas victorino afecciones:  Femi reacción alérgica al medicamento.  Un efecto secundario no deseado de un medicamento determinado.  Sensibilidad extrema a la tristin solar causada por el medicamento.  ¿Qué incrementa el riesgo?  Si daily alguno de estos medicamentos que hacen que mullen piel se vuelva sensible a la tristin y se expone a la tristin solar, puede volverlo más propenso a desarrollar esta afección:  Antibióticos, incluidas las tetraciclinas y las sulfamidas.  Antifúngicos.  Antihistamínicos.  Diuréticos.  Retinoides, yoli la isotretinoína.  Estatinas.  Antiinflamatorios no esteroides (CRISTY).  ¿Cuáles son los signos o síntomas?  A person scratching their arm.  Los síntomas de esta afección incluyen:  Enrojecimiento.  Pequeñas protuberancias.  Descamación.  Picazón.  Ronchas que causan picazón (urticaria).  Hinchazón.  ¿Cómo se diagnostica?  Esta afección se puede diagnosticar en función de lo siguiente:  Un examen físico.  Estudios para averiguar qué medicamentos causaron la erupción. Estas pruebas pueden incluir lo siguiente:  Pruebas cutáneas.  Análisis de alec.  ¿Cómo se trata?  Esta afección se trata con medicamentos, por ejemplo:  Un antihistamínico. Catrina se puede administrar para aliviar la picazón.  Antiinflamatorios no esteroides (CRISTY). Estos se pueden administrar para reducir la hinchazón y para tratar el dolor.  Un medicamento con corticoesteroide. Catrina se puede administrar para reducir la hinchazón.  Generalmente, la erupción desaparece al dejar de cleopatra el medicamento que la causó.    Siga estas indicaciones en mullen casa:  Use los medicamentos de venta edwardo y los recetados solamente yoli se lo haya indicado el médico.  Informe a todos kathy médicos acerca de cualquier reacción medicamentosa que haya tenido en el pasado.  Si la erupción fue ocasionada por la sensibilidad a la tristin del sol, mientras se faith la erupción:  De ser posible, evite estar al sol, en especial cuando está más jacki, normalmente entre las 10 a. m. y las 4 p. m.  Cúbrase la piel con pantalones, mangas largas y un sombrero cuando esté expuesto a la tristin del sol.  Si tiene urticaria:  [place] femi ducha de agua fría o utilice femi compresa fría para aliviar la picazón.  Utilice antihistamínicos de venta edwardo, según lo recomendado por el médico, hasta que la urticaria haya desaparecido. La urticaria no es contagiosa.  Concurra a todas las visitas de seguimiento. [place] es importante.  Comuníquese con un médico si:  Tiene fiebre.  La erupción no desaparece.  La irritación empeora.  La erupción reaparece.  Emite sonidos de silbidos agudos al respirar, más a menudo al exhalar (sibilancias) o toser.  Solicite ayuda de inmediato si:  Comienza a tener problemas respiratorios.  Comienza a faltarle el aire.  El parish o la garganta comienza a hinchársele.  Tiene debilidad intensa con mareos o desmayos.  Siente dolor en el pecho.  La piel comienza a ampollarse y descamarse.  Estos síntomas pueden representar un problema grave que constituye femi emergencia. No espere a kevin si los síntomas desaparecen. Solicite atención médica de inmediato. Comuníquese con el servicio de emergencias de mullen localidad (911 en los [place]). No conduzca por kathy propios medios hasta el hospital.    Resumen  Femi erupción medicamentosa ocurre cuando un medicamento provoca un cambio en el color o la textura de la piel. La erupción puede aparecer en femi ravin pequeña de piel o en todo el cuerpo.  Puede desarrollarse minutos, horas o días después de cleopatra el medicamento.  El médico hará varios estudios para determinar qué medicamento causó la erupción.  La erupción puede tratarse con medicamentos para aliviar la picazón, la hinchazón y el dolor.  Esta información no tiene yoli fin reemplazar el consejo del médico. Asegúrese de hacerle al médico cualquier pregunta que tenga.

## 2024-05-28 NOTE — ED ADULT TRIAGE NOTE - CHIEF COMPLAINT QUOTE
Pt walks in for triage after having an allergic reaction after taking 3 doses of keflex for a UTI. Airway is patent and respirations are even and unlabored, pt has hives on face and some on neck.

## 2024-05-28 NOTE — ED PROVIDER NOTE - CARE PLAN
1 Principal Discharge DX:	Drug rash   Principal Discharge DX:	Drug rash  Secondary Diagnosis:	Acute UTI

## 2024-05-28 NOTE — ED PROVIDER NOTE - OBJECTIVE STATEMENT
25 y/o F with no significant PMH presents for pruritic rash that started on her face and spread to the rest of her body that started 2 days ago immediately after taking her first dose of Keflex for a UTI. She notes body aches, chills and headache as well. She has not taken any other medication at home, has not checked a temperature or taken any ibuprofen or tylenol for fever. She was here 2 days ago for lower abdominal pain and was found to have a UTI. She has never taken Keflex before. She denies difficulty breathing, nausea, vomiting, oral/mucosal rashes or rashes on palms/soles. She does not have a primary care physician.

## 2024-05-28 NOTE — ED PROVIDER NOTE - CLINICAL SUMMARY MEDICAL DECISION MAKING FREE TEXT BOX
23 y/o F presents for drug rash to body, sparing palms/soles/mucosal membranes that started after taking her first dose of Keflex 2 days ago. She has been febrile and not taking anything for fever. No airway/GI involvement. Rash does not appear urticarial in nature, however is pruritic. Informed patient and  treatment is to stop Keflex and will send alternative ABx. Since no culture was sent on the original UA, will send subsequent UA today with culture, will start Bactrim and give patient HRH and allergy follow up. Rx sent to pharmacy for ibuprofen, benadryl, pepcid, prednisone as well as Bactrim.

## 2024-05-28 NOTE — ED ADULT NURSE NOTE - NSFALLUNIVINTERV_ED_ALL_ED
Bed/Stretcher in lowest position, wheels locked, appropriate side rails in place/Call bell, personal items and telephone in reach/Instruct patient to call for assistance before getting out of bed/chair/stretcher/Non-slip footwear applied when patient is off stretcher/[place] to call system/Physically safe environment - no spills, clutter or unnecessary equipment/Purposeful proactive rounding/Room/bathroom lighting operational, light cord in reach

## 2024-05-28 NOTE — ED ADULT NURSE NOTE - OBJECTIVE STATEMENT
Pt presents with c/o pruritic rash that started on her face and spread to the rest of her body that started 2 days ago immediately after taking her first dose of Keflex for a UTI. Denies taking any other meds. she was seen here 2 days ago for lower abdominal pain and was found to have a UTI. She has never taken Keflex before. She denies difficulty breathing, nausea, vomiting, oral/mucosal rashes or rashes on palms/soles.

## 2024-05-28 NOTE — ED PROVIDER NOTE - PATIENT PORTAL LINK FT
You can access the FollowMyHealth Patient Portal offered by [facility] by registering at the following website: [email or web address]. By joining [employer]’s FollowMyHealth portal, you will also be able to view your health information using other applications (apps) compatible with our system.

## 2024-05-29 LAB
CULTURE RESULTS: SIGNIFICANT CHANGE UP
SPECIMEN SOURCE: SIGNIFICANT CHANGE UP

## 2025-05-16 ENCOUNTER — APPOINTMENT (OUTPATIENT)
Dept: ULTRASOUND IMAGING | Facility: CLINIC | Age: 25
End: 2025-05-16

## 2025-05-16 ENCOUNTER — APPOINTMENT (OUTPATIENT)
Dept: MAMMOGRAPHY | Facility: CLINIC | Age: 25
End: 2025-05-16

## 2025-05-16 ENCOUNTER — OUTPATIENT (OUTPATIENT)
Dept: OUTPATIENT SERVICES | Facility: HOSPITAL | Age: 25
LOS: 1 days | End: 2025-05-16
Payer: COMMERCIAL

## 2025-05-16 DIAGNOSIS — N64.4 MASTODYNIA: ICD-10-CM

## 2025-05-16 PROCEDURE — 76641 ULTRASOUND BREAST COMPLETE: CPT | Mod: 26,50

## 2025-05-16 PROCEDURE — 76641 ULTRASOUND BREAST COMPLETE: CPT

## 2025-08-09 ENCOUNTER — EMERGENCY (EMERGENCY)
Facility: HOSPITAL | Age: 25
LOS: 1 days | End: 2025-08-09
Attending: STUDENT IN AN ORGANIZED HEALTH CARE EDUCATION/TRAINING PROGRAM
Payer: MEDICAID

## 2025-08-09 VITALS
SYSTOLIC BLOOD PRESSURE: 126 MMHG | HEIGHT: 66 IN | OXYGEN SATURATION: 96 % | DIASTOLIC BLOOD PRESSURE: 81 MMHG | WEIGHT: 176.37 LBS | RESPIRATION RATE: 18 BRPM | TEMPERATURE: 97 F | HEART RATE: 105 BPM

## 2025-08-09 LAB
ALBUMIN SERPL ELPH-MCNC: 4.5 G/DL — SIGNIFICANT CHANGE UP (ref 3.3–5.2)
ALP SERPL-CCNC: 60 U/L — SIGNIFICANT CHANGE UP (ref 40–120)
ALT FLD-CCNC: 16 U/L — SIGNIFICANT CHANGE UP
AMORPH CRY # UR COMP ASSIST: PRESENT
ANION GAP SERPL CALC-SCNC: 11 MMOL/L — SIGNIFICANT CHANGE UP (ref 5–17)
APPEARANCE UR: ABNORMAL
AST SERPL-CCNC: 16 U/L — SIGNIFICANT CHANGE UP
BACTERIA # UR AUTO: ABNORMAL /HPF
BASOPHILS # BLD AUTO: 0.02 K/UL — SIGNIFICANT CHANGE UP (ref 0–0.2)
BASOPHILS NFR BLD AUTO: 0.2 % — SIGNIFICANT CHANGE UP (ref 0–2)
BILIRUB SERPL-MCNC: 0.3 MG/DL — LOW (ref 0.4–2)
BILIRUB UR-MCNC: NEGATIVE — SIGNIFICANT CHANGE UP
BLD GP AB SCN SERPL QL: SIGNIFICANT CHANGE UP
BUN SERPL-MCNC: 6.4 MG/DL — LOW (ref 8–20)
CALCIUM SERPL-MCNC: 9.4 MG/DL — SIGNIFICANT CHANGE UP (ref 8.4–10.5)
CAST: 2 /LPF — SIGNIFICANT CHANGE UP (ref 0–4)
CHLORIDE SERPL-SCNC: 101 MMOL/L — SIGNIFICANT CHANGE UP (ref 96–108)
CO2 SERPL-SCNC: 24 MMOL/L — SIGNIFICANT CHANGE UP (ref 22–29)
COLOR SPEC: YELLOW — SIGNIFICANT CHANGE UP
CREAT SERPL-MCNC: 0.49 MG/DL — LOW (ref 0.5–1.3)
DIFF PNL FLD: ABNORMAL
EGFR: 132 ML/MIN/1.73M2 — SIGNIFICANT CHANGE UP
EGFR: 132 ML/MIN/1.73M2 — SIGNIFICANT CHANGE UP
EOSINOPHIL # BLD AUTO: 0.37 K/UL — SIGNIFICANT CHANGE UP (ref 0–0.5)
EOSINOPHIL NFR BLD AUTO: 4.3 % — SIGNIFICANT CHANGE UP (ref 0–6)
GLUCOSE SERPL-MCNC: 108 MG/DL — HIGH (ref 70–99)
GLUCOSE UR QL: NEGATIVE MG/DL — SIGNIFICANT CHANGE UP
HCG SERPL-ACNC: HIGH MIU/ML
HCT VFR BLD CALC: 38.7 % — SIGNIFICANT CHANGE UP (ref 34.5–45)
HGB BLD-MCNC: 12.8 G/DL — SIGNIFICANT CHANGE UP (ref 11.5–15.5)
IMM GRANULOCYTES # BLD AUTO: 0.02 K/UL — SIGNIFICANT CHANGE UP (ref 0–0.07)
IMM GRANULOCYTES NFR BLD AUTO: 0.2 % — SIGNIFICANT CHANGE UP (ref 0–0.9)
KETONES UR QL: NEGATIVE MG/DL — SIGNIFICANT CHANGE UP
LEUKOCYTE ESTERASE UR-ACNC: ABNORMAL
LIDOCAIN IGE QN: 28 U/L — SIGNIFICANT CHANGE UP (ref 22–51)
LYMPHOCYTES # BLD AUTO: 2.04 K/UL — SIGNIFICANT CHANGE UP (ref 1–3.3)
LYMPHOCYTES NFR BLD AUTO: 23.5 % — SIGNIFICANT CHANGE UP (ref 13–44)
MCHC RBC-ENTMCNC: 29.8 PG — SIGNIFICANT CHANGE UP (ref 27–34)
MCHC RBC-ENTMCNC: 33.1 G/DL — SIGNIFICANT CHANGE UP (ref 32–36)
MCV RBC AUTO: 90.2 FL — SIGNIFICANT CHANGE UP (ref 80–100)
MONOCYTES # BLD AUTO: 0.63 K/UL — SIGNIFICANT CHANGE UP (ref 0–0.9)
MONOCYTES NFR BLD AUTO: 7.2 % — SIGNIFICANT CHANGE UP (ref 2–14)
NEUTROPHILS # BLD AUTO: 5.61 K/UL — SIGNIFICANT CHANGE UP (ref 1.8–7.4)
NEUTROPHILS NFR BLD AUTO: 64.6 % — SIGNIFICANT CHANGE UP (ref 43–77)
NITRITE UR-MCNC: NEGATIVE — SIGNIFICANT CHANGE UP
NRBC # BLD AUTO: 0 K/UL — SIGNIFICANT CHANGE UP (ref 0–0)
NRBC # FLD: 0 K/UL — SIGNIFICANT CHANGE UP (ref 0–0)
NRBC BLD AUTO-RTO: 0 /100 WBCS — SIGNIFICANT CHANGE UP (ref 0–0)
PH UR: 6.5 — SIGNIFICANT CHANGE UP (ref 5–8)
PLATELET # BLD AUTO: 324 K/UL — SIGNIFICANT CHANGE UP (ref 150–400)
PMV BLD: 9.2 FL — SIGNIFICANT CHANGE UP (ref 7–13)
POTASSIUM SERPL-MCNC: 3.9 MMOL/L — SIGNIFICANT CHANGE UP (ref 3.5–5.3)
POTASSIUM SERPL-SCNC: 3.9 MMOL/L — SIGNIFICANT CHANGE UP (ref 3.5–5.3)
PROT SERPL-MCNC: 7.8 G/DL — SIGNIFICANT CHANGE UP (ref 6.6–8.7)
PROT UR-MCNC: NEGATIVE MG/DL — SIGNIFICANT CHANGE UP
RBC # BLD: 4.29 M/UL — SIGNIFICANT CHANGE UP (ref 3.8–5.2)
RBC # FLD: 12.2 % — SIGNIFICANT CHANGE UP (ref 10.3–14.5)
RBC CASTS # UR COMP ASSIST: 9 /HPF — HIGH (ref 0–4)
SODIUM SERPL-SCNC: 136 MMOL/L — SIGNIFICANT CHANGE UP (ref 135–145)
SP GR SPEC: 1.02 — SIGNIFICANT CHANGE UP (ref 1–1.03)
SQUAMOUS # UR AUTO: 22 /HPF — HIGH (ref 0–5)
UROBILINOGEN FLD QL: 1 MG/DL — SIGNIFICANT CHANGE UP (ref 0.2–1)
WBC # BLD: 8.69 K/UL — SIGNIFICANT CHANGE UP (ref 3.8–10.5)
WBC # FLD AUTO: 8.69 K/UL — SIGNIFICANT CHANGE UP (ref 3.8–10.5)
WBC UR QL: 7 /HPF — HIGH (ref 0–5)

## 2025-08-09 PROCEDURE — 80053 COMPREHEN METABOLIC PANEL: CPT

## 2025-08-09 PROCEDURE — 84702 CHORIONIC GONADOTROPIN TEST: CPT

## 2025-08-09 PROCEDURE — 36415 COLL VENOUS BLD VENIPUNCTURE: CPT

## 2025-08-09 PROCEDURE — 76817 TRANSVAGINAL US OBSTETRIC: CPT | Mod: 26

## 2025-08-09 PROCEDURE — 83690 ASSAY OF LIPASE: CPT

## 2025-08-09 PROCEDURE — T1013: CPT

## 2025-08-09 PROCEDURE — 86901 BLOOD TYPING SEROLOGIC RH(D): CPT

## 2025-08-09 PROCEDURE — 87077 CULTURE AEROBIC IDENTIFY: CPT

## 2025-08-09 PROCEDURE — 76801 OB US < 14 WKS SINGLE FETUS: CPT | Mod: 26

## 2025-08-09 PROCEDURE — 81001 URINALYSIS AUTO W/SCOPE: CPT

## 2025-08-09 PROCEDURE — 86850 RBC ANTIBODY SCREEN: CPT

## 2025-08-09 PROCEDURE — 99284 EMERGENCY DEPT VISIT MOD MDM: CPT

## 2025-08-09 PROCEDURE — 86900 BLOOD TYPING SEROLOGIC ABO: CPT

## 2025-08-09 PROCEDURE — 76801 OB US < 14 WKS SINGLE FETUS: CPT

## 2025-08-09 PROCEDURE — 76817 TRANSVAGINAL US OBSTETRIC: CPT

## 2025-08-09 PROCEDURE — 99284 EMERGENCY DEPT VISIT MOD MDM: CPT | Mod: 25

## 2025-08-09 PROCEDURE — 85025 COMPLETE CBC W/AUTO DIFF WBC: CPT

## 2025-08-09 PROCEDURE — 87086 URINE CULTURE/COLONY COUNT: CPT

## 2025-08-09 RX ORDER — CEPHALEXIN 250 MG/1
1 CAPSULE ORAL
Qty: 10 | Refills: 0
Start: 2025-08-09 | End: 2025-08-13

## 2025-08-09 RX ORDER — CEPHALEXIN 250 MG/1
500 CAPSULE ORAL EVERY 12 HOURS
Refills: 0 | Status: ACTIVE | OUTPATIENT
Start: 2025-08-09 | End: 2026-07-08

## 2025-08-09 RX ADMIN — CEPHALEXIN 500 MILLIGRAM(S): 250 CAPSULE ORAL at 13:02

## 2025-08-10 LAB
CULTURE RESULTS: ABNORMAL
SPECIMEN SOURCE: SIGNIFICANT CHANGE UP

## 2025-09-03 ENCOUNTER — ASOB RESULT (OUTPATIENT)
Age: 25
End: 2025-09-03

## 2025-09-03 ENCOUNTER — APPOINTMENT (OUTPATIENT)
Dept: ANTEPARTUM | Facility: CLINIC | Age: 25
End: 2025-09-03
Payer: MEDICAID

## 2025-09-03 PROCEDURE — 76801 OB US < 14 WKS SINGLE FETUS: CPT
